# Patient Record
Sex: MALE | Race: WHITE | Employment: UNEMPLOYED
[De-identification: names, ages, dates, MRNs, and addresses within clinical notes are randomized per-mention and may not be internally consistent; named-entity substitution may affect disease eponyms.]

---

## 2020-03-02 ENCOUNTER — HEALTH MAINTENANCE LETTER (OUTPATIENT)
Age: 40
End: 2020-03-02

## 2021-02-17 ENCOUNTER — IMMUNIZATION (OUTPATIENT)
Dept: NURSING | Facility: CLINIC | Age: 41
End: 2021-02-17
Payer: COMMERCIAL

## 2021-02-17 PROCEDURE — 91300 PR COVID VAC PFIZER DIL RECON 30 MCG/0.3 ML IM: CPT

## 2021-02-17 PROCEDURE — 0001A PR COVID VAC PFIZER DIL RECON 30 MCG/0.3 ML IM: CPT

## 2021-03-10 ENCOUNTER — IMMUNIZATION (OUTPATIENT)
Dept: NURSING | Facility: CLINIC | Age: 41
End: 2021-03-10
Attending: INTERNAL MEDICINE
Payer: COMMERCIAL

## 2021-03-10 PROCEDURE — 91300 PR COVID VAC PFIZER DIL RECON 30 MCG/0.3 ML IM: CPT

## 2021-03-10 PROCEDURE — 0002A PR COVID VAC PFIZER DIL RECON 30 MCG/0.3 ML IM: CPT

## 2022-06-20 ENCOUNTER — HOSPITAL ENCOUNTER (EMERGENCY)
Facility: CLINIC | Age: 42
Discharge: HOME | End: 2022-06-20
Attending: STUDENT IN AN ORGANIZED HEALTH CARE EDUCATION/TRAINING PROGRAM | Admitting: STUDENT IN AN ORGANIZED HEALTH CARE EDUCATION/TRAINING PROGRAM
Payer: COMMERCIAL

## 2022-06-20 VITALS
DIASTOLIC BLOOD PRESSURE: 83 MMHG | SYSTOLIC BLOOD PRESSURE: 132 MMHG | OXYGEN SATURATION: 99 % | RESPIRATION RATE: 19 BRPM | BODY MASS INDEX: 25.75 KG/M2 | WEIGHT: 150 LBS | HEART RATE: 74 BPM

## 2022-06-20 DIAGNOSIS — M79.651 PAIN OF RIGHT THIGH: ICD-10-CM

## 2022-06-20 PROCEDURE — 99283 EMERGENCY DEPT VISIT LOW MDM: CPT

## 2022-06-20 PROCEDURE — 93005 ELECTROCARDIOGRAM TRACING: CPT | Performed by: STUDENT IN AN ORGANIZED HEALTH CARE EDUCATION/TRAINING PROGRAM

## 2022-06-20 ASSESSMENT — ENCOUNTER SYMPTOMS
NECK PAIN: 0
DIZZINESS: 1
HEADACHES: 0
MYALGIAS: 1
NUMBNESS: 0
BACK PAIN: 0
WEAKNESS: 0
SHORTNESS OF BREATH: 0

## 2022-06-21 NOTE — ED PROVIDER NOTES
EMERGENCY DEPARTMENT ENCOUNTER      NAME: Jun Mcgill  AGE: 41 year old male  YOB: 1980  MRN: [id number]  EVALUATION DATE & TIME: 6/20/2022  7:16 PM    PCP: Thien Dumont    ED PROVIDER: Tim Florian M.D.      Chief Complaint   Patient presents with     Leg Pain     Arm Pain         FINAL IMPRESSION:  1. Pain of right thigh          ED COURSE & MEDICAL DECISION MAKING:    Pertinent Labs & Imaging studies reviewed. (See chart for details)  41 year old male presents to the Emergency Department for evaluation of leg pain.  Patient had a very vague history which consisted of pain in his leg and arm.  There was concern because this was on the right side of his body that this could be related to his brain.  I had a lengthy discussion and took a lengthy history with the patient and did not identify that the symptoms are neuro like and they seem to be clear pain.  We will discharge patient home with conservative treatment and have him follow-up with his primary doctor.    At the conclusion of the encounter I discussed the results of all of the tests and the disposition. The questions were answered. The patient or family acknowledged understanding and was agreeable with the care plan.     8:20 PM I met with the patient, obtained history, performed an initial exam, and discussed options and plan for diagnostics and treatment here in the ED. Patient is agreeable with and EKG and discharge.   8:45 PM I updated the patient on EKG results. We discussed the plan for discharge and the patient is agreeable. Reviewed supportive cares, symptomatic treatment, outpatient follow up, and reasons to return to the Emergency Department.    MEDICATIONS GIVEN IN THE EMERGENCY:  Medications - No data to display    NEW PRESCRIPTIONS STARTED AT TODAY'S ER VISIT  There are no discharge medications for this patient.         =================================================================    HPI    Patient information was  obtained from: patient    Use of : N/A      Jun Mcgill is a 41 year old male with a pertinent history of vertigo who presents to this ED via private vehicle for evaluation of leg pain and dizziness.    Patient reports a two day history of intermittent dizziness, most recently occurring last night. He notes a history of vertigo though this does not feel similar. He also endorses a one day history of aching right posterior thigh pain and developed a similar aching pain in his right upper extremity today. He does note this feels similar to a pulled muscle, though denies any increased exertion or abnormal activities over the weekend. He has not taken any medications for his symptoms. He was seen at [facility] today and given his symptoms, he was recommended to come to the ED for further evaluation. Otherwise denies any focal numbness or weakness, paresthesias, headache, blurred vision, diplopia, chest pain, dyspnea, neck pain, back pain, or any other symptoms or concerns at this time.        REVIEW OF SYSTEMS   Review of Systems   Eyes: Negative for visual disturbance (blurred vision or diplopia).   Respiratory: Negative for shortness of breath.    Cardiovascular: Negative for chest pain.   Musculoskeletal: Positive for myalgias (RUE and right posterior thigh). Negative for back pain and neck pain.   Neurological: Positive for dizziness. Negative for weakness, numbness and headaches.        Negative for paresthesias   All other systems reviewed and are negative.       PAST MEDICAL HISTORY:  Past Medical History:   Diagnosis Date     Diverticulitis      Diverticulitis of sigmoid colon      GERD (gastroesophageal reflux disease)      Hematochezia 11/14/2012       PAST SURGICAL HISTORY:  Past Surgical History:   Procedure Laterality Date     ABDOMEN SURGERY       COLONOSCOPY N/A 7/7/2015    Procedure: COLONOSCOPY;  Surgeon: Mila Guzman MD;  [facility]  [facility]  LESION TRUNK  9/20/2013    Procedure: EXCISE LESION TRUNK;  EXCISION TRUNK, CHEST, UPPER RIGHT THIGH , UPPER LEFT THIGH AND RIGHT ARM, RIGHT HIP.AND BACK LIPOMAS X 11;  Surgeon: Timur Burleson MD;  Location: [facility]     EXCISE MASS TRUNK  7/8/2013    Procedure: EXCISE MASS TRUNK;  EXCISION MULTIPLE LIPOMAS, LOWER BACK, LEFT HIP, RIGHT FOREARM,LEFT FOREARM;  Surgeon: Timur Burleson MD;  Location: [facility]     GI SURGERY       LAPAROSCOPIC ASSISTED COLECTOMY LEFT (DESCENDING) N/A 7/8/2015    Procedure: LAPAROSCOPIC ASSISTED COLECTOMY LEFT (DESCENDING);  Surgeon: Mila Guzman MD;  Location:  OR     SOFT TISSUE SURGERY      lipomas excised     VASECTOMY             CURRENT MEDICATIONS:    No current outpatient medications on file.      ALLERGIES:  Allergies   Allergen Reactions     Other [No Clinical Screening - See Comments]      Dissolvable sutures       FAMILY HISTORY:  Family History   Adopted: Yes       SOCIAL HISTORY:   Social History     Socioeconomic History     Marital status:    Tobacco Use     Smoking status: Former Smoker     Packs/day: 0.50     Years: 14.00     Pack years: 7.00     Types: Cigarettes, Cigarettes, Cigarettes   Substance and Sexual Activity     Alcohol use: No     Alcohol/week: 0.0 standard drinks     Comment: rare     Drug use: No     Sexual activity: Yes     Partners: Female       VITALS:  /83   Pulse 74   Resp 19   Wt 68 kg (150 lb)   SpO2 99%   BMI 25.75 kg/m        PHYSICAL EXAM    Constitutional: Well developed, Well nourished, NAD, GCS 15  HENT: Normocephalic, Atraumatic, Bilateral external ears normal, Oropharynx normal, mucous membranes moist, Nose normal. Neck-  Normal range of motion, No tenderness, Supple, No stridor.  Eyes: PERRL, EOMI, Conjunctiva normal, No discharge.   Respiratory: Normal breath sounds, No respiratory distress, No wheezing, Speaks full sentences easily. No cough.  Cardiovascular: Normal heart rate, Regular rhythm, No  murmurs, No rubs, No gallops. Chest wall nontender.  GI:Soft, No tenderness, No masses, No flank tenderness. No rebound or guarding.   Musculoskeletal: 2+ DP pulses. No edema.No cyanosis, No clubbing. Good range of motion in all major joints. No tenderness to palpation or major deformities noted.   Integument: Warm, Dry, No erythema, No rash. No petechiae.   Neurologic: Alert & oriented x 3,  CN 3-12 intact Normal motor function, Normal sensory function, No focal deficits noted. Normal gait. Normal finger to nose bilaterally  Psychiatric: Affect normal, Judgment normal, Mood normal. Cooperative.       EKG:    Performed at: 20:41 on 6/20/2022    Impression: Normal EKG.    Rate: 74 bpm  Rhythm: Sinus rhythm  Axis: 24  MT Interval: 146 ms  QRS Interval: 90 ms  QTc Interval: 428 ms  ST Changes: None  Comparison: When compared with EKG from 3/11/2020, ventricular rate has decreased by 41 bpm.    I have independently reviewed and interpreted the EKG(s) documented above.      I, Reva Ga, am serving as a scribe to document services personally performed by Dr. Tim Florian based on my observation and the provider's statements to me. I, Tim Florian MD attest that Reva Ga is acting in a scribe capacity, has observed my performance of the services and has documented them in accordance with my direction.    Tim Florian M.D.  Emergency Medicine  [facility] EMERGENCY ROOM  [address] [id number] [phone]  Dept: [phone]      Tim Florian MD  06/28/22 [id number]

## 2022-06-21 NOTE — ED NOTES
Reports right sided leg pain that started yesterday   Today patient started experiencing right arm numbness/tingling and aching   Patient went to [facility] for the leg pain and was diagnosed with a muscle sprain, per patient   Got told to come to ED due to new arm tingling/numbness     Denies any pain

## 2022-06-21 NOTE — ED TRIAGE NOTES
Pt arrives with right posterior thigh pain that radiates down his leg that started yesterday. Today he noticed the same achy feeling in his right arm and radiates down to his fingers. Denies any weakness.     Triage Assessment     Row Name 06/20/22 1913       Triage Assessment (Adult)    Airway WDL WDL       Respiratory WDL    Respiratory WDL WDL       Skin Circulation/Temperature WDL    Skin Circulation/Temperature WDL WDL       Cardiac WDL    Cardiac WDL WDL       Peripheral/Neurovascular WDL    Peripheral Neurovascular WDL WDL       Cognitive/Neuro/Behavioral WDL    Cognitive/Neuro/Behavioral WDL WDL

## 2022-07-20 LAB
ATRIAL RATE - MUSE: 74 BPM
DIASTOLIC BLOOD PRESSURE - MUSE: NORMAL MMHG
INTERPRETATION ECG - MUSE: NORMAL
P AXIS - MUSE: 55 DEGREES
PR INTERVAL - MUSE: 146 MS
QRS DURATION - MUSE: 90 MS
QT - MUSE: 386 MS
QTC - MUSE: 428 MS
R AXIS - MUSE: 24 DEGREES
SYSTOLIC BLOOD PRESSURE - MUSE: NORMAL MMHG
T AXIS - MUSE: 26 DEGREES
VENTRICULAR RATE- MUSE: 74 BPM